# Patient Record
Sex: FEMALE | Race: WHITE | Employment: UNEMPLOYED | URBAN - NONMETROPOLITAN AREA
[De-identification: names, ages, dates, MRNs, and addresses within clinical notes are randomized per-mention and may not be internally consistent; named-entity substitution may affect disease eponyms.]

---

## 2021-04-27 ENCOUNTER — OFFICE VISIT (OUTPATIENT)
Dept: PRIMARY CARE CLINIC | Age: 56
End: 2021-04-27
Payer: COMMERCIAL

## 2021-04-27 VITALS
DIASTOLIC BLOOD PRESSURE: 80 MMHG | RESPIRATION RATE: 16 BRPM | OXYGEN SATURATION: 98 % | HEART RATE: 92 BPM | TEMPERATURE: 97.6 F | BODY MASS INDEX: 29.53 KG/M2 | HEIGHT: 64 IN | WEIGHT: 173 LBS | SYSTOLIC BLOOD PRESSURE: 123 MMHG

## 2021-04-27 DIAGNOSIS — Z13.1 SCREENING FOR DIABETES MELLITUS: ICD-10-CM

## 2021-04-27 DIAGNOSIS — L98.9 LESION OF SKIN OF FACE: ICD-10-CM

## 2021-04-27 DIAGNOSIS — Z12.11 SCREEN FOR COLON CANCER: ICD-10-CM

## 2021-04-27 DIAGNOSIS — C53.9 MALIGNANT NEOPLASM OF CERVIX, UNSPECIFIED SITE (HCC): ICD-10-CM

## 2021-04-27 DIAGNOSIS — Z13.220 ENCOUNTER FOR SCREENING FOR LIPID DISORDER: ICD-10-CM

## 2021-04-27 DIAGNOSIS — Z12.31 SCREENING MAMMOGRAM, ENCOUNTER FOR: ICD-10-CM

## 2021-04-27 DIAGNOSIS — Z00.00 WELL ADULT HEALTH CHECK: Primary | ICD-10-CM

## 2021-04-27 PROCEDURE — 99386 PREV VISIT NEW AGE 40-64: CPT | Performed by: NURSE PRACTITIONER

## 2021-04-27 RX ORDER — VALACYCLOVIR HYDROCHLORIDE 1 G/1
TABLET, FILM COATED ORAL
Qty: 6 TABLET | Refills: 1 | Status: SHIPPED | OUTPATIENT
Start: 2021-04-27 | End: 2022-05-19

## 2021-04-27 RX ORDER — DOXYCYCLINE 100 MG/1
CAPSULE ORAL
COMMUNITY
Start: 2021-04-09 | End: 2021-12-10

## 2021-04-27 RX ORDER — CONJUGATED ESTROGENS 0.62 MG/G
CREAM VAGINAL
Qty: 1 TUBE | Refills: 3 | Status: SHIPPED | OUTPATIENT
Start: 2021-04-27 | End: 2021-08-05 | Stop reason: SDUPTHER

## 2021-04-27 SDOH — HEALTH STABILITY: MENTAL HEALTH: HOW MANY STANDARD DRINKS CONTAINING ALCOHOL DO YOU HAVE ON A TYPICAL DAY?: NOT ASKED

## 2021-04-27 SDOH — HEALTH STABILITY: MENTAL HEALTH: HOW OFTEN DO YOU HAVE A DRINK CONTAINING ALCOHOL?: MONTHLY OR LESS

## 2021-04-27 ASSESSMENT — PATIENT HEALTH QUESTIONNAIRE - PHQ9
1. LITTLE INTEREST OR PLEASURE IN DOING THINGS: 0
SUM OF ALL RESPONSES TO PHQ QUESTIONS 1-9: 0
SUM OF ALL RESPONSES TO PHQ QUESTIONS 1-9: 0

## 2021-04-27 ASSESSMENT — ENCOUNTER SYMPTOMS
GASTROINTESTINAL NEGATIVE: 1
COLOR CHANGE: 1
EYES NEGATIVE: 1
RESPIRATORY NEGATIVE: 1

## 2021-04-27 NOTE — PROGRESS NOTES
[facility] ED PHYSICIAN SERVICES  [facility]  [address]  [address] [address]  Dept: [phone]  Dept Fax: [phone]  Loc: [phone]    Arnie Mauro is a 54 y.o. female who presents today for her medical conditions/complaints as noted below. Arnie Mauro is c/o of Established New Doctor (patient presents today to establish care. Former patient of Dr. Ty Booth from [facility]. )        HPI:     HPI   Chief Complaint   Patient presents with   Sol Dasilva New Doctor     patient presents today to establish care. Former patient of Dr. Ty Booth from [facility]. She has a couple places she would like me to look at. She has been using some over-the-counter supplement for her menopausal symptoms. She had a hysterectomy at a young age in kept her ovaries. She no longer thinks they were working she started having some hot flashes and night sweats. The over-the-counter medicine has helped with that but not the vaginal dryness. [place] is painful. She has never done a colonoscopy in the past.  It has been several years since she had a mammogram and that would have been in 1983 [facility]. Occasionally on her bottom she will have a sore that reoccurs usually in the same place she had it tested for herpes and was told it was negative. Her  has had herpes of the eye and she is wondering if they could have some form of herpes.   Past Medical History:   Diagnosis Date    Allergic rhinitis     Cervical cancer ([address] 01/01/1999    Skin cancer     on back       Past Surgical History:   Procedure Laterality Date    HYSTERECTOMY, TOTAL ABDOMINAL  1999       Vitals 9/20/7753   SYSTOLIC 889   DIASTOLIC 80   Pulse 92   Temp 97.6   Resp 16   SpO2 98   Weight 173 lb   Height 5' 4\"   Body mass index 29.69 kg/m2       Family History   Problem Relation Age of Onset    Diabetes Mother     Hypertension Mother     Diabetes Father     Coronary Art Dis Sister        Social History Tobacco Use    Smoking status: Never Smoker    Smokeless tobacco: Never Used   Substance Use Topics    Alcohol use: Yes     Frequency: Monthly or less      Current Outpatient Medications   Medication Sig Dispense Refill    doxycycline monohydrate (MONODOX) 100 MG capsule       valACYclovir (VALTREX) 1 g tablet Take 2 at onset of sore and repeat 2 in 24 hours 6 tablet 1    conjugated estrogens (PREMARIN) 0.625 MG/GM vaginal cream 1/2 applicator nightly for 7 nights then 2 nights a week. 1 Tube 3     No current facility-administered medications for this visit. No Known Allergies    Health Maintenance   Topic Date Due    Hepatitis C screen  Never done    HIV screen  Never done    COVID-19 Vaccine (1) Never done    DTaP/Tdap/Td vaccine (1 - Tdap) Never done    Lipid screen  Never done    Diabetes screen  Never done    Breast cancer screen  Never done    Shingles Vaccine (1 of 2) Never done    Colon cancer screen colonoscopy  Never done    Flu vaccine (Season Ended) 09/01/2021    Hepatitis A vaccine  Aged Out    Hepatitis B vaccine  Aged Out    Hib vaccine  Aged Out    Meningococcal (ACWY) vaccine  Aged Out    Pneumococcal 0-64 years Vaccine  Aged Out       Subjective:      Review of Systems   Constitutional: Negative. HENT: Negative. Eyes: Negative. Respiratory: Negative. Cardiovascular: Negative. Gastrointestinal: Negative. Genitourinary: Negative. Musculoskeletal: Negative. Skin: Positive for color change and wound. Neurological: Negative. Psychiatric/Behavioral: Negative. Objective:     Physical Exam  Vitals signs and nursing note reviewed. Constitutional:       Appearance: She is well-developed. HENT:      Head: Normocephalic. Right Ear: Tympanic membrane and external ear normal.      Left Ear: Tympanic membrane and external ear normal.      Nose: Nose normal.   Eyes:      Pupils: Pupils are equal, round, and reactive to light.    Neck: materials -see patient instructions. Discussed use, benefit, and side effects of prescribed medications. All patient questions answered. Pt voiced understanding. Reviewed health maintenance. Instructed to continue currentmedications, diet and exercise. Patient agreed with treatment plan. Follow up as directed. MEDICATIONS:  Orders Placed This Encounter   Medications    valACYclovir (VALTREX) 1 g tablet     Sig: Take 2 at onset of sore and repeat 2 in 24 hours     Dispense:  6 tablet     Refill:  1     Place on hold till patient calls    conjugated estrogens (PREMARIN) 0.625 MG/GM vaginal cream     Si/2 applicator nightly for 7 nights then 2 nights a week. Dispense:  1 Tube     Refill:  3     Has coupon         ORDERS:  Orders Placed This Encounter   Procedures    Cologuard    JOVAN DIGITAL SCREEN W OR WO CAD BILATERAL    External Referral To ENT       Follow-up:  Return in about 1 year (around 2022). PATIENT INSTRUCTIONS:  Patient Instructions   Refer ent [place] for lesion nose  Try the premarin vaginal cream and see if helps the dryness  May continue the over counter menopause med, may consider later hormone replacement  Work on diet and exercise  Return for fasting labs  cologard at home  Mammogram yearly. Valtrex is if sore pops up    Electronically signed by LAWANDA Lazcano CNP on 2021 at 1:32 PM    EMR Dragon/transcription disclaimer:  Much of thisencounter note is electronic transcription/translation of spoken language to printed texts. The electronic translation of spoken language may be erroneous, or at times, nonsensical words or phrases may be inadvertentlytranscribed.   Although I have reviewed the note for such errors, some may still exist.

## 2021-04-27 NOTE — PATIENT INSTRUCTIONS
Refer ent Bogdan Acuna for lesion nose  Try the premarin vaginal cream and see if helps the dryness  May continue the over counter menopause med, may consider later hormone replacement  Work on diet and exercise  Return for fasting labs  cologard at home  Mammogram yearly.    Valtrex is if sore pops up

## 2021-05-05 ENCOUNTER — HOSPITAL ENCOUNTER (OUTPATIENT)
Dept: WOMENS IMAGING | Age: 56
Discharge: HOME | End: 2021-05-05
Payer: COMMERCIAL

## 2021-05-05 DIAGNOSIS — Z12.31 SCREENING MAMMOGRAM, ENCOUNTER FOR: ICD-10-CM

## 2021-05-05 PROCEDURE — 77067 SCR MAMMO BI INCL CAD: CPT

## 2021-05-07 ENCOUNTER — NURSE ONLY (OUTPATIENT)
Dept: PRIMARY CARE CLINIC | Age: 56
End: 2021-05-07
Payer: COMMERCIAL

## 2021-05-07 DIAGNOSIS — Z13.220 ENCOUNTER FOR SCREENING FOR LIPID DISORDER: ICD-10-CM

## 2021-05-07 DIAGNOSIS — Z13.1 SCREENING FOR DIABETES MELLITUS: ICD-10-CM

## 2021-05-07 LAB
ALBUMIN SERPL-MCNC: 4.6 G/DL (ref 3.5–5.2)
ALP BLD-CCNC: 71 U/L (ref 35–104)
ALT SERPL-CCNC: 18 U/L (ref 5–33)
ANION GAP SERPL CALCULATED.3IONS-SCNC: 14 MMOL/L (ref 7–19)
AST SERPL-CCNC: 18 U/L (ref 5–32)
BILIRUB SERPL-MCNC: 0.4 MG/DL (ref 0.2–1.2)
BUN BLDV-MCNC: 13 MG/DL (ref 6–20)
CALCIUM SERPL-MCNC: 9.6 MG/DL (ref 8.6–10)
CHLORIDE BLD-SCNC: 106 MMOL/L (ref 98–111)
CHOLESTEROL, TOTAL: 222 MG/DL (ref 160–199)
CO2: 22 MMOL/L (ref 22–29)
CREAT SERPL-MCNC: 0.5 MG/DL (ref 0.5–0.9)
GFR AFRICAN AMERICAN: >59
GFR NON-AFRICAN AMERICAN: >60
GLUCOSE BLD-MCNC: 82 MG/DL (ref 74–109)
HDLC SERPL-MCNC: 78 MG/DL (ref 65–121)
LDL CHOLESTEROL CALCULATED: 116 MG/DL
POTASSIUM SERPL-SCNC: 4.4 MMOL/L (ref 3.5–5)
SODIUM BLD-SCNC: 142 MMOL/L (ref 136–145)
TOTAL PROTEIN: 8.2 G/DL (ref 6.6–8.7)
TRIGL SERPL-MCNC: 138 MG/DL (ref 0–149)

## 2021-05-07 PROCEDURE — 36415 COLL VENOUS BLD VENIPUNCTURE: CPT | Performed by: NURSE PRACTITIONER

## 2021-05-17 DIAGNOSIS — Z12.11 SCREEN FOR COLON CANCER: ICD-10-CM

## 2021-07-21 ENCOUNTER — OFFICE VISIT (OUTPATIENT)
Dept: OTOLARYNGOLOGY | Facility: CLINIC | Age: 56
End: 2021-07-21

## 2021-07-21 VITALS — TEMPERATURE: 96.9 F | HEART RATE: 80 BPM | DIASTOLIC BLOOD PRESSURE: 80 MMHG | SYSTOLIC BLOOD PRESSURE: 148 MMHG

## 2021-07-21 DIAGNOSIS — D48.5 NEOPLASM OF UNCERTAIN BEHAVIOR OF SKIN: Primary | ICD-10-CM

## 2021-07-21 PROCEDURE — 99203 OFFICE O/P NEW LOW 30 MIN: CPT | Performed by: OTOLARYNGOLOGY

## 2021-07-21 RX ORDER — CONJUGATED ESTROGENS 0.62 MG/G
CREAM VAGINAL
COMMUNITY
Start: 2021-04-27

## 2021-07-21 RX ORDER — MULTIPLE VITAMINS W/ MINERALS TAB 9MG-400MCG
1 TAB ORAL DAILY
COMMUNITY

## 2021-07-21 RX ORDER — VALACYCLOVIR HYDROCHLORIDE 1 G/1
TABLET, FILM COATED ORAL
COMMUNITY
Start: 2021-04-27

## 2021-07-21 NOTE — PATIENT INSTRUCTIONS
CONTACT INFORMATION:  The main office phone number is [phone]. For emergencies after hours and on weekends, this number will convert over to our answering service and the on call provider will answer. Please try to keep non emergent phone calls/ questions to office hours 9am-5pm Monday through Friday.     R[employer]ron  As an alternative, you can sign up and use the Epic MyChart system for more direct and quicker access for non emergent questions/ problems.  [facility] RF[employer]ron allows you to send messages to your doctor, view your test results, renew your prescriptions, schedule appointments, and more. To sign up, go to YODIL and click on the Sign Up Now link in the New User? box. Enter your [employer] Activation Code exactly as it appears below along with the last four digits of your Social Security Number and your Date of Birth () to complete the sign-up process. If you do not sign up before the expiration date, you must request a new code.    R[employer] Activation Code: [email or web address]  Expires: 2021 12:04 PM    If you have questions, you can email [email or web address] or call [phone] to talk to our RFMic[employer] staff. Remember, [employer] is NOT to be used for urgent needs. For medical emergencies, dial 911.

## 2021-07-21 NOTE — PROGRESS NOTES
PRIMARY CARE PROVIDER: Rose Vitale APRN  REFERRING PROVIDER: No ref. provider found    Chief Complaint   Patient presents with   • Nevus     Mole to left of nose       Subjective   History of Present Illness:  Yamileth Ontiveros is a  55 y.o. female who presents for consultation regarding a skin lesion of the left medial canthal area.  The lesion has the following characteristics:    Quality: Pigmented lesion  Severity: Mild  Duration: 4-5 years  Timing: constant  Modifying Factors: None  Associated Signs & Symptoms: This has been changing and getting slightly larger over the past few years    Hx of melanoma on her back removed 1999.    Moved from [place] due to her 's job at [employer].  They may be moving to [place] in early 2022.    Review of Systems:  Review of Systems   Constitutional: Negative for chills, fatigue, fever and unexpected weight change.   HENT: Negative for facial swelling.    Respiratory: Negative for cough, chest tightness and shortness of breath.    Cardiovascular: Negative for chest pain.   Musculoskeletal: Negative for neck pain.   Skin: Positive for color change.   Neurological: Negative for facial asymmetry.   Hematological: Negative for adenopathy. Does not bruise/bleed easily.       Past History:  History reviewed. No pertinent past medical history.  Past Surgical History:   Procedure Laterality Date   • HYSTERECTOMY     • SKIN CANCER EXCISION       Family History   Problem Relation Age of Onset   • Diabetes Mother    • Hypertension Mother    • Heart failure Sister      Social History     Tobacco Use   • Smoking status: Never Smoker   • Smokeless tobacco: Never Used   Substance Use Topics   • Alcohol use: Not Currently   • Drug use: Never     Allergies:  Patient has no known allergies.    Current Outpatient Medications:   •  conjugated estrogens (Premarin) 0.625 MG/GM vaginal cream, 1/2 applicator nightly for 7 nights then 2 nights a week., Disp: , Rfl:   •  multivitamin with  minerals tablet tablet, Take 1 tablet by mouth Daily., Disp: , Rfl:   •  valACYclovir (VALTREX) 1000 MG tablet, Take 2 at onset of sore and repeat 2 in 24 hours, Disp: , Rfl:     Objective     Vital Signs:  Temp:  [96.9 °F (36.1 °C)] 96.9 °F (36.1 °C)  Heart Rate:  [80] 80  BP: (148)/(80) 148/80    Physical Exam:  Physical Exam  Vitals and nursing note reviewed.   Constitutional:       General: She is not in acute distress.     Appearance: She is well-developed. She is not diaphoretic.   HENT:      Head: Normocephalic and atraumatic.        Right Ear: External ear normal.      Left Ear: External ear normal.      Nose: Nose normal.   Eyes:      General: No scleral icterus.        Right eye: No discharge.         Left eye: No discharge.      Conjunctiva/sclera: Conjunctivae normal.      Pupils: Pupils are equal, round, and reactive to light.   Neck:      Thyroid: No thyromegaly.      Vascular: No JVD.      Trachea: No tracheal deviation.   Pulmonary:      Effort: Pulmonary effort is normal.      Breath sounds: No stridor.   Musculoskeletal:         General: No deformity. Normal range of motion.      Cervical back: Normal range of motion and neck supple.   Lymphadenopathy:      Cervical: No cervical adenopathy.   Skin:     General: Skin is warm and dry.      Coloration: Skin is not pale.      Findings: No erythema or rash.   Neurological:      Mental Status: She is alert and oriented to person, place, and time.      Cranial Nerves: No cranial nerve deficit.      Coordination: Coordination normal.   Psychiatric:         Speech: Speech normal.         Behavior: Behavior normal. Behavior is cooperative.         Thought Content: Thought content normal.         Judgment: Judgment normal.         Operative plan:    Excision with linear closure    Assessment   1. Neoplasm of uncertain behavior of skin        Plan      I have offered excision of the lesion of the left nasal sidewall/medial canthus with  permanent section  analysis and likely  linear closure closure in the office under local anesthesia.  She would like to wait until her grandson goes back to school.    Discussion of skin lesion. Discussed risks, benefits, alternatives, and possible complications of excision of the skin lesion with reconstruction utilizing local tissue rearrangement, full-thickness skin grafting, or local interpolated flaps. Risks include, but are not limited too: bleeding, infection, hematoma, recurrence, need for additional procedures, flap failure, cosmetic deformity. Patient understands risks and would like to proceed with surgery.     My findings and recommendations were discussed and questions were answered.     Zoran Hutchison MD  07/21/21  12:08 CDT

## 2021-08-05 RX ORDER — CONJUGATED ESTROGENS 0.62 MG/G
CREAM VAGINAL
Qty: 1 TUBE | Refills: 3 | Status: SHIPPED | OUTPATIENT
Start: 2021-08-05

## 2021-08-26 ENCOUNTER — TRANSCRIBE ORDERS (OUTPATIENT)
Dept: LAB | Facility: HOSPITAL | Age: 56
End: 2021-08-26

## 2021-08-26 DIAGNOSIS — Z01.818 PREOP TESTING: Primary | ICD-10-CM

## 2021-08-30 ENCOUNTER — LAB (OUTPATIENT)
Dept: LAB | Facility: HOSPITAL | Age: 56
End: 2021-08-30

## 2021-08-30 LAB — SARS-COV-2 ORF1AB RESP QL NAA+PROBE: NOT DETECTED

## 2021-08-30 PROCEDURE — C9803 HOPD COVID-19 SPEC COLLECT: HCPCS | Performed by: OTOLARYNGOLOGY

## 2021-08-30 PROCEDURE — U0004 COV-19 TEST NON-CDC HGH THRU: HCPCS | Performed by: OTOLARYNGOLOGY

## 2021-09-01 ENCOUNTER — PROCEDURE VISIT (OUTPATIENT)
Dept: OTOLARYNGOLOGY | Facility: CLINIC | Age: 56
End: 2021-09-01

## 2021-09-01 VITALS
SYSTOLIC BLOOD PRESSURE: 136 MMHG | HEIGHT: 64 IN | RESPIRATION RATE: 20 BRPM | TEMPERATURE: 98 F | BODY MASS INDEX: 28.17 KG/M2 | DIASTOLIC BLOOD PRESSURE: 78 MMHG | WEIGHT: 165 LBS | HEART RATE: 80 BPM

## 2021-09-01 DIAGNOSIS — D48.5 NEOPLASM OF UNCERTAIN BEHAVIOR OF SKIN: Primary | ICD-10-CM

## 2021-09-01 PROCEDURE — 13151 CMPLX RPR E/N/E/L 1.1-2.5 CM: CPT | Performed by: OTOLARYNGOLOGY

## 2021-09-01 PROCEDURE — 88305 TISSUE EXAM BY PATHOLOGIST: CPT | Performed by: OTOLARYNGOLOGY

## 2021-09-01 PROCEDURE — 11641 EXC F/E/E/N/L MAL+MRG 0.6-1: CPT | Performed by: OTOLARYNGOLOGY

## 2021-09-01 NOTE — PATIENT INSTRUCTIONS
CONTACT INFORMATION:  The main office phone number is [phone]. For emergencies after hours and on weekends, this number will convert over to our answering service and the on call provider will answer. Please try to keep non emergent phone calls/ questions to office hours 9am-5pm Monday through Friday.     MyTinks  As an alternative, you can sign up and use the Epic MyChart system for more direct and quicker access for non emergent questions/ problems.  [facility] MyTinks allows you to send messages to your doctor, view your test results, renew your prescriptions, schedule appointments, and more. To sign up, go to [employer] and click on the Sign Up Now link in the New User? box. Enter your MyTinks Activation Code exactly as it appears below along with the last four digits of your Social Security Number and your Date of Birth () to complete the sign-up process. If you do not sign up before the expiration date, you must request a new code.    MyTinks Activation Code: [email or web address]  Expires: 2021 12:53 PM    If you have questions, you can email [email or web address] or call [phone] to talk to our MyTinks staff. Remember, MyTinks is NOT to be used for urgent needs. For medical emergencies, dial 911.

## 2021-09-01 NOTE — PROGRESS NOTES
PATIENT NAME:  Yamileth Ontiveros    DATE:  09/01/21    PREOPERATIVE DIAGNOSIS: Changing nevus of left nasal sidewall    POSTOPERATIVE DIAGNOSIS: Changing nevus of left nasal sidewall    PROCEDURE:  1) excision of neoplasm of uncertain behavior skin of left nasal sidewall, 8 mm x 20 mm   2) closure skin of nose, 2.0 cm    SURGEON:  Zoran Hutchison MD, FACS    FACILITY: [facility] Office Procedure Room    ANESTHESIA:  Local with 1 cc 1% lidocaine and 1:100,000 epinephrine    DICTATED BY:  Zoran Hutchison MD, FACS    IVF: None    IMPLANTS: None    DRAINS: None    SPECIMENS: Neoplasm uncertain behavior left nasal sidewall, stitch 12-permanent    EBL: 20 cc    COMPLICATIONS: None apparent    INDICATIONS FOR SURGERY: Weston presented with a pigmented, changing neoplasm of the left nasal sidewall.  She opted for surgical excision for full pathological analysis.    OPERATIVE FINDINGS:     Lesion: 5 mm x 4 mm  Margins: 1.5 mm  Defect: 8 mm x 20 mm  Depth: Through dermis  Closure Length: 2.0 cm    OPERATIVE DETAILS:       After patient verification consent material was reviewed, the patient was taken to the procedure room and laid supine on the procedure table.  The skin at the area was cleansed with alcohol, the area marked, the patient was then handed a mirror where we reviewed the intended excision, and verified the consent.  This served as the formal timeout procedure.  The skin was  infiltrated with 1% lidocaine with 1-100,000 epinephrine.    After approximately 15 minutes, the skin was tested for anesthesia, and deemed appropriate.  The lesion was marked with the above listed dimensions, the appropriate margins, as listed above, were drawn around this.  This was then converted to a fusiform-type incision to allow closure and to decrease the standing cutaneous deformities associated with circular to oval-type defects.    The patient was then sterilely prepped and draped.    A 15 blade was used to incise the skin  along the prior-marked plan.  The skin was then undermined in the subcutaneous plane utilizing a #15.  The specimen was oriented with a suture at 12:00 and sent for permanent section analysis.    Utilizing a fresh 15 blade, the skin was undermined in the subcutaneous plane for approximately 5 mm in each direction to facilitate wound closure with reduced tension, and wound eversion.    The skin was closed utilizing deep, buried 5-0 undyed Monocryl suture.  The overlying skin was closed utilizing interrupted 6-0 Prolene.    Antibiotic ointment was placed to the incisions.      DISPOSITION:  The procedures were completed without complication and tolerated well.  The patient was released in the company of herself to return home in satisfactory condition.  A follow-up appointment has been scheduled, routine post-op medications prescribed (if required), and post-op instructions were given to the responsible party.           Zoran Hutchison MD, FACS  Board Certified Facial Plastic and Reconstructive Surgery  Board Certified Otolaryngology -- Head and Neck Surgery    Electronically signed by Zoran Hutchison MD, 09/01/21, 11:57 AM CDT.

## 2021-09-04 LAB
CYTO UR: NORMAL
LAB AP CASE REPORT: NORMAL
LAB AP CLINICAL INFORMATION: NORMAL
LAB AP DIAGNOSIS COMMENT: NORMAL
PATH REPORT.FINAL DX SPEC: NORMAL
PATH REPORT.GROSS SPEC: NORMAL

## 2021-09-09 ENCOUNTER — OFFICE VISIT (OUTPATIENT)
Dept: OTOLARYNGOLOGY | Facility: CLINIC | Age: 56
End: 2021-09-09

## 2021-09-09 DIAGNOSIS — Z48.02 VISIT FOR SUTURE REMOVAL: Primary | ICD-10-CM

## 2021-09-09 PROCEDURE — 99024 POSTOP FOLLOW-UP VISIT: CPT | Performed by: OTOLARYNGOLOGY

## 2021-09-09 NOTE — PROGRESS NOTES
Pt here for suture removal.  Pt tolerated well and was given instructions on the use of scar away.  Pt voiced understanding.  Pt was given Path report and voiced understanding.

## 2021-09-13 ENCOUNTER — TRANSCRIBE ORDERS (OUTPATIENT)
Dept: LAB | Facility: HOSPITAL | Age: 56
End: 2021-09-13

## 2021-09-13 DIAGNOSIS — Z01.818 PREOP TESTING: Primary | ICD-10-CM

## 2021-10-22 ENCOUNTER — LAB (OUTPATIENT)
Dept: LAB | Facility: HOSPITAL | Age: 56
End: 2021-10-22

## 2021-10-22 DIAGNOSIS — Z01.818 PREOP TESTING: ICD-10-CM

## 2021-10-22 LAB — SARS-COV-2 ORF1AB RESP QL NAA+PROBE: NOT DETECTED

## 2021-10-22 PROCEDURE — C9803 HOPD COVID-19 SPEC COLLECT: HCPCS

## 2021-10-22 PROCEDURE — U0004 COV-19 TEST NON-CDC HGH THRU: HCPCS

## 2021-10-25 ENCOUNTER — PROCEDURE VISIT (OUTPATIENT)
Dept: OTOLARYNGOLOGY | Facility: CLINIC | Age: 56
End: 2021-10-25

## 2021-10-25 VITALS — SYSTOLIC BLOOD PRESSURE: 149 MMHG | HEART RATE: 81 BPM | DIASTOLIC BLOOD PRESSURE: 83 MMHG | TEMPERATURE: 97.7 F

## 2021-10-25 DIAGNOSIS — C44.311 BASAL CELL CARCINOMA (BCC) OF LATERAL SIDE WALL OF NOSE: Primary | ICD-10-CM

## 2021-10-25 PROCEDURE — 13151 CMPLX RPR E/N/E/L 1.1-2.5 CM: CPT | Performed by: OTOLARYNGOLOGY

## 2021-10-25 PROCEDURE — 88305 TISSUE EXAM BY PATHOLOGIST: CPT | Performed by: OTOLARYNGOLOGY

## 2021-10-25 PROCEDURE — 11641 EXC F/E/E/N/L MAL+MRG 0.6-1: CPT | Performed by: OTOLARYNGOLOGY

## 2021-10-25 NOTE — PROGRESS NOTES
PATIENT NAME:  Yamileth Ontiveros    DATE:  10/25/21    PREOPERATIVE DIAGNOSIS: Basal cell carcinoma skin of left nasal sidewall at the medial canthus    POSTOPERATIVE DIAGNOSIS: Basal cell carcinoma skin of the left nasal sidewall at the medial canthus    PROCEDURE:  1) excision of malignant neoplasm skin left nasal sidewall, 5 mm x 18 mm   2) complex closure skin of nose, 2.0 cm    SURGEON:  Zoran Hutchison MD, FACS    FACILITY: [facility] Office Procedure Room    ANESTHESIA:  Local with 2 cc 1% lidocaine and 1:100,000 epinephrine    DICTATED BY:  Zoran Hutchison MD, FACS    IVF: None    IMPLANTS: None    DRAINS: None    SPECIMENS: Basal cell carcinoma skin of nose, stitch at 12:00-permanent    EBL: Less than 10 cc    COMPLICATIONS: None apparent    INDICATIONS FOR SURGERY: Ms. Ontiveros initially presented with a pigmented lesion of the left nasal sidewall close to the medial canthus.  An excisional biopsy was performed.  Final pathology demonstrated a pigmented basal cell carcinoma with involvement around 2:00.  She presented for further excision.  I discussed the options of the frozen section versus permanent section analysis.  We opted on permanent section analysis due to a more accurate ability to assess the margin.    OPERATIVE FINDINGS:     Lesion: Prior incision measuring 0.5 mm in thickness by 18 mm  Margins: 3 mm on the 12:00 to 6:00 side, 1.5 mm on the 6:00 to 12:00 side  Defect: 5 mm x 18 mm  Depth: Through dermis  Closure Length: 2.0 cm    OPERATIVE DETAILS:       After patient verification consent material was reviewed, the patient was taken to the procedure room and laid supine on the procedure table.  The skin at the area was cleansed with alcohol, the area marked, the patient was then handed a mirror where we reviewed the intended excision, and verified the consent.  This served as the formal timeout procedure.  The skin was  infiltrated with 1% lidocaine with 1-100,000 epinephrine.    After  approximately 15 minutes, the skin was tested for anesthesia, and deemed appropriate.  The lesion was marked with the above listed dimensions, the appropriate margins, as listed above, were drawn around this.  This was then converted to a fusiform-type incision to allow closure and to decrease the standing cutaneous deformities associated with circular to oval-type defects.    The patient was then sterilely prepped and draped.    A 15 blade was used to incise the skin along the prior-marked plan.  The skin was then undermined in the subcutaneous plane utilizing a #15.  The specimen was oriented with a suture at 12:00 and sent for permanent section analysis.    Utilizing a fresh 15 blade, the skin was undermined in the subcutaneous plane for approximately 6 mm in each direction to facilitate wound closure with reduced tension, and wound eversion, as the defect was at the medial canthus and I did not want to incite disruption of the lacrimal system.    The skin was closed utilizing deep, buried 5-0 undyed Monocryl suture.  The overlying skin was closed utilizing running, locking 6-0 Prolene.    Antibiotic ointment was placed to the incisions.      DISPOSITION:  The procedures were completed without complication and tolerated well.  The patient was released in the company of her  to return home in satisfactory condition.  A follow-up appointment has been scheduled, routine post-op medications prescribed (if required), and post-op instructions were given to the responsible party.           Zoran Hutchison MD, FACS  Board Certified Facial Plastic and Reconstructive Surgery  Board Certified Otolaryngology -- Head and Neck Surgery    Electronically signed by Zoran Hutchison MD, 10/25/21, 9:38 AM CDT.

## 2021-10-25 NOTE — PATIENT INSTRUCTIONS
CONTACT INFORMATION:  The main office phone number is [phone]. For emergencies after hours and on weekends, this number will convert over to our answering service and the on call provider will answer. Please try to keep non emergent phone calls/ questions to office hours 9am-5pm Monday through Friday.     [employer]  As an alternative, you can sign up and use the Epic MyChart system for more direct and quicker access for non emergent questions/ problems.  [facility] [employer] allows you to send messages to your doctor, view your test results, renew your prescriptions, schedule appointments, and more. To sign up, go to Ghost and click on the Sign Up Now link in the New User? box. Enter your [employer] Activation Code exactly as it appears below along with the last four digits of your Social Security Number and your Date of Birth () to complete the sign-up process. If you do not sign up before the expiration date, you must request a new code.    [employer] Activation Code: [email or web address]  Expires: 2021  8:46 AM    If you have questions, you can email [email or web address] or call [phone] to talk to our [employer] staff. Remember, [employer] is NOT to be used for urgent needs. For medical emergencies, dial 911.

## 2021-10-27 LAB
CYTO UR: NORMAL
LAB AP CASE REPORT: NORMAL
LAB AP CLINICAL INFORMATION: NORMAL
PATH REPORT.FINAL DX SPEC: NORMAL
PATH REPORT.GROSS SPEC: NORMAL

## 2021-11-02 ENCOUNTER — OFFICE VISIT (OUTPATIENT)
Dept: OTOLARYNGOLOGY | Facility: CLINIC | Age: 56
End: 2021-11-02

## 2021-11-02 DIAGNOSIS — Z48.02 VISIT FOR SUTURE REMOVAL: Primary | ICD-10-CM

## 2021-11-02 PROCEDURE — 99024 POSTOP FOLLOW-UP VISIT: CPT | Performed by: OTOLARYNGOLOGY

## 2021-11-02 NOTE — PROGRESS NOTES
Patient is here for post-op visit aet exc of bcc of left nasal dorsum. Patient is doing well and wound looks great. She was given instructions on wound care after suture removal and she will follow up in 3-4 month call in between time if she has any concerns. patient was advise on importance of seeing a Dermatologist. She is going to make an appointment to have full body check.

## 2021-12-10 ENCOUNTER — OFFICE VISIT (OUTPATIENT)
Dept: PRIMARY CARE CLINIC | Age: 56
End: 2021-12-10
Payer: COMMERCIAL

## 2021-12-10 VITALS
HEART RATE: 92 BPM | OXYGEN SATURATION: 97 % | SYSTOLIC BLOOD PRESSURE: 130 MMHG | DIASTOLIC BLOOD PRESSURE: 80 MMHG | HEIGHT: 64 IN | TEMPERATURE: 97.6 F | BODY MASS INDEX: 26.98 KG/M2 | WEIGHT: 158 LBS

## 2021-12-10 DIAGNOSIS — J06.9 VIRAL UPPER RESPIRATORY TRACT INFECTION: Primary | ICD-10-CM

## 2021-12-10 PROCEDURE — 99213 OFFICE O/P EST LOW 20 MIN: CPT | Performed by: NURSE PRACTITIONER

## 2021-12-10 RX ORDER — MULTIPLE VITAMINS W/ MINERALS TAB 9MG-400MCG
1 TAB ORAL DAILY
COMMUNITY

## 2021-12-10 RX ORDER — AMOXICILLIN AND CLAVULANATE POTASSIUM 875; 125 MG/1; MG/1
1 TABLET, FILM COATED ORAL 2 TIMES DAILY
Qty: 14 TABLET | Refills: 0 | Status: SHIPPED | OUTPATIENT
Start: 2021-12-10 | End: 2021-12-17

## 2021-12-10 RX ORDER — METHYLPREDNISOLONE 4 MG/1
TABLET ORAL
Qty: 1 KIT | Refills: 0 | Status: SHIPPED | OUTPATIENT
Start: 2021-12-10 | End: 2021-12-16

## 2021-12-10 ASSESSMENT — ENCOUNTER SYMPTOMS
SINUS PAIN: 1
COUGH: 1

## 2021-12-10 NOTE — PROGRESS NOTES
[facility] PHYSICIAN SERVICES  [facility]  [address] [place] [address]  [address] [place] [address]  Dept: [phone]  Dept Fax: [phone]  Loc: [phone]    Fabiana Green is a 64 y.o. female who presents today for her medical conditions/complaints as noted below. Fabiana Green is c/o of Congestion (head and chest.  Since monday morning.  )        HPI:     HPI   Chief Complaint   Patient presents with    Congestion     head and chest.  Since monday morning. Past Medical History:   Diagnosis Date    Allergic rhinitis     Cervical cancer ([facility] [address] 01/01/1999    Skin cancer     on back       Past Surgical History:   Procedure Laterality Date    HYSTERECTOMY, TOTAL ABDOMINAL  1999       Vitals 12/10/2021 2/27/0120   SYSTOLIC 905 845   DIASTOLIC 80 80   Pulse 92 92   Temp 97.6 97.6   Resp - 16   SpO2 97 98   Weight 158 lb 173 lb   Height 5' 4\" 5' 4\"   Body mass index 27.12 kg/m2 29.69 kg/m2       Family History   Problem Relation Age of Onset    Diabetes Mother     Hypertension Mother     Diabetes Father     Coronary Art Dis Sister        Social History     Tobacco Use    Smoking status: Never Smoker    Smokeless tobacco: Never Used   Substance Use Topics    Alcohol use: Yes      Current Outpatient Medications on File Prior to Visit   Medication Sig Dispense Refill    Multiple Vitamins-Minerals (THERA M PLUS) TABS Take 1 tablet by mouth daily      conjugated estrogens (PREMARIN) 0.625 MG/GM vaginal cream 1/2 applicator nightly for 7 nights then 2 nights a week. 1 Tube 3    valACYclovir (VALTREX) 1 g tablet Take 2 at onset of sore and repeat 2 in 24 hours (Patient not taking: Reported on 12/10/2021) 6 tablet 1     No current facility-administered medications on file prior to visit.      No Known Allergies    Health Maintenance   Topic Date Due    Hepatitis C screen  Never done    COVID-19 Vaccine (1) Never done    HIV screen  Never done    DTaP/Tdap/Td vaccine (1 - Tdap) Never done    Cervical time was spent counseling and coordinating care for a total time of 20min face to face. I offered to do a Covid test and she declined. See the plan below. PDMP Monitoring:    Last PDMP [place] as Reviewed [facility]):  Review User Review Instant Review Result            Urine Drug Screenings (1 yr)    No resulted procedures found. Medication Contract and Consent for Opioid Use Documents Filed      No documents found                 Patient given educational materials -see patient instructions. Discussed use, benefit, and side effects of prescribed medications. All patient questions answered. Pt voiced understanding. Reviewed health maintenance. Instructed to continue currentmedications, diet and exercise. Patient agreed with treatment plan. Follow up as directed. MEDICATIONS:  Orders Placed This Encounter   Medications    amoxicillin-clavulanate (AUGMENTIN) 875-125 MG per tablet     Sig: Take 1 tablet by mouth 2 times daily for 7 days     Dispense:  14 tablet     Refill:  0    methylPREDNISolone (MEDROL DOSEPACK) 4 MG tablet     Sig: Take by mouth. For inflammation     Dispense:  1 kit     Refill:  0         ORDERS:  No orders of the defined types were placed in this encounter. Follow-up:  No follow-ups on file. PATIENT INSTRUCTIONS:  Patient Instructions   Lots of clear liquids  mucinex if in chest , thins secretion  flonase nasal spray otc if sinus pressure  Start the augmentin antibiotic tonight  May start medrol pack steroid tomorrow for inlammation if desired. If worse symptoms could go get covid checked  Continue vitamins for immune health. Electronically signed by LAWANDA Vogel CNP on 12/10/2021 at 4:19 PM    EMR Dragon/transcription disclaimer:  Much of thisencounter note is electronic transcription/translation of spoken language to printed texts.   The electronic translation of spoken language may be erroneous, or at times, nonsensical words or phrases may be inadvertentlytranscribed.   Although I have reviewed the note for such errors, some may still exist.

## 2021-12-10 NOTE — PATIENT INSTRUCTIONS
Lots of clear liquids  mucinex if in chest , thins secretion  flonase nasal spray otc if sinus pressure  Start the augmentin antibiotic tonight  May start medrol pack steroid tomorrow for inlammation if desired. If worse symptoms could go get covid checked  Continue vitamins for immune health.

## 2022-01-12 ENCOUNTER — OFFICE VISIT (OUTPATIENT)
Dept: OTOLARYNGOLOGY | Facility: CLINIC | Age: 57
End: 2022-01-12

## 2022-01-12 VITALS
SYSTOLIC BLOOD PRESSURE: 130 MMHG | TEMPERATURE: 98 F | RESPIRATION RATE: 20 BRPM | WEIGHT: 165 LBS | DIASTOLIC BLOOD PRESSURE: 74 MMHG | HEIGHT: 64 IN | HEART RATE: 76 BPM | BODY MASS INDEX: 28.17 KG/M2

## 2022-01-12 DIAGNOSIS — Z85.828 ENCOUNTER FOR FOLLOW-UP SURVEILLANCE OF SKIN CANCER: Primary | ICD-10-CM

## 2022-01-12 DIAGNOSIS — Z08 ENCOUNTER FOR FOLLOW-UP SURVEILLANCE OF SKIN CANCER: Primary | ICD-10-CM

## 2022-01-12 DIAGNOSIS — C44.311 BASAL CELL CARCINOMA (BCC) OF LATERAL SIDE WALL OF NOSE: ICD-10-CM

## 2022-01-12 PROCEDURE — 99212 OFFICE O/P EST SF 10 MIN: CPT | Performed by: OTOLARYNGOLOGY

## 2022-01-12 NOTE — PROGRESS NOTES
PRIMARY CARE PROVIDER: Rose Vitale APRN  REFERRING PROVIDER: No ref. provider found    Chief Complaint   Patient presents with   • Follow-up     exc lesion of nasal sidewall       Subjective   History of Present Illness:  Yamileth Ontiveros is a  56 y.o. female who presents for head neck skin cancer surveillance.  She underwent excision of a basal cell carcinoma of the left nasal sidewall at the medial canthus, with complex linear closure on October 25, 2021.  She is doing reasonably well.  She denies any new, or suspicious lesions of the head or neck.      She is breathing well.    She is moving to [place] in a few months.    Review of Systems:  Review of Systems   Constitutional: Negative for chills, fatigue, fever and unexpected weight change.   HENT: Negative for facial swelling.    Respiratory: Negative for cough, chest tightness and shortness of breath.    Cardiovascular: Negative for chest pain.   Musculoskeletal: Negative for neck pain.   Skin: Negative for color change.   Neurological: Negative for facial asymmetry.   Hematological: Negative for adenopathy. Does not bruise/bleed easily.       Past History:  No past medical history on file.  Past Surgical History:   Procedure Laterality Date   • HYSTERECTOMY     • SKIN CANCER EXCISION     • SKIN LESION EXCISION      exc left nasal sidewall lesion 09/01/21     Family History   Problem Relation Age of Onset   • Diabetes Mother    • Hypertension Mother    • Heart failure Sister      Social History     Tobacco Use   • Smoking status: Never Smoker   • Smokeless tobacco: Never Used   Substance Use Topics   • Alcohol use: Not Currently   • Drug use: Never     Allergies:  Patient has no known allergies.    Current Outpatient Medications:   •  conjugated estrogens (Premarin) 0.625 MG/GM vaginal cream, 1/2 applicator nightly for 7 nights then 2 nights a week., Disp: , Rfl:   •  multivitamin with minerals tablet tablet, Take 1 tablet by mouth Daily., Disp: , Rfl:   •   valACYclovir (VALTREX) 1000 MG tablet, Take 2 at onset of sore and repeat 2 in 24 hours, Disp: , Rfl:       Objective     Vital Signs:  Temp:  [98 °F (36.7 °C)] 98 °F (36.7 °C)  Resp:  [20] 20    Physical Exam:  Physical Exam  Vitals and nursing note reviewed.   Constitutional:       General: She is not in acute distress.     Appearance: She is well-developed. She is not diaphoretic.   HENT:      Head: Normocephalic and atraumatic.        Right Ear: External ear normal.      Left Ear: External ear normal.      Nose: Nose normal.   Eyes:      General: No scleral icterus.        Right eye: No discharge.         Left eye: No discharge.      Conjunctiva/sclera: Conjunctivae normal.      Pupils: Pupils are equal, round, and reactive to light.   Neck:      Thyroid: No thyromegaly.      Vascular: No JVD.      Trachea: No tracheal deviation.   Pulmonary:      Effort: Pulmonary effort is normal.      Breath sounds: No stridor.   Musculoskeletal:         General: No deformity. Normal range of motion.      Cervical back: Normal range of motion and neck supple.   Lymphadenopathy:      Cervical: No cervical adenopathy.   Skin:     General: Skin is warm and dry.      Coloration: Skin is not pale.      Findings: No erythema or rash.   Neurological:      Mental Status: She is alert and oriented to person, place, and time.      Cranial Nerves: No cranial nerve deficit.      Coordination: Coordination normal.   Psychiatric:         Speech: Speech normal.         Behavior: Behavior normal. Behavior is cooperative.         Thought Content: Thought content normal.         Judgment: Judgment normal.         Results Review:   I have reviewed her excisional pathology which demonstrates a scar and multinucleated giant cell reaction with no residual tumor.  The original excisional pathology demonstrated a pigmented basal cell carcinoma with margin involvement and abutting the 2:00 margin    Assessment   Assessment:  1. Encounter for  follow-up surveillance of skin cancer    2. Basal cell carcinoma (BCC) of lateral side wall of nose        Plan   Plan:    [facility], Post-Procedure Instructions:    Protect the incisions from sunlight. Sunlight to the incisions will cause permanent pigmentation to the incision line and make the incision more noticeable. After the incision has reepithelialized (typically 2-3 weeks after the procedure), you may begin to use sunscreen with an SPF of 15 or greater    For the first week after your procedure, apply a thin coat of Vaseline to the incision 3-4 times daily.  Starting the second week, use a silicone-based wound cream to the incisions to optimize the end result. Apply topically twice daily, or as directed, to help optimize wound healing and decrease redness.    It is very important to continue routine skin checks with a dermatologist or your PCP every 6-12 months.     F/U PRN - Moving to [place].    My findings and recommendations were discussed and questions were answered.     Zoran Hutchison MD  01/12/22  14:55 CST

## 2022-01-12 NOTE — PATIENT INSTRUCTIONS
CONTACT INFORMATION:  The main office phone number is [phone]. For emergencies after hours and on weekends, this number will convert over to our answering service and the on call provider will answer. Please try to keep non emergent phone calls/ questions to office hours 9am-5pm Monday through Friday.     [facility]  As an alternative, you can sign up and use the Epic MyChart system for more direct and quicker access for non emergent questions/ problems.  [facility] allows you to send messages to your doctor, view your test results, renew your prescriptions, schedule appointments, and more. To sign up, go to [employer] and click on the Sign Up Now link in the New User? box. Enter your [facility] [facility] Activation Code exactly as it appears below along with the last four digits of your Social Security Number and your Date of Birth () to complete the sign-up process. If you do not sign up before the expiration date, you must request a new code.    [employer] [facility] Activation Code: [email or web address]  Expires: 2022  5:41 AM    If you have questions, you can email [email or web address] or call [phone] to talk to our [facility] staff. Remember, [employer] is NOT to be used for urgent needs. For medical emergencies, dial 911.

## 2022-02-25 ENCOUNTER — TELEPHONE (OUTPATIENT)
Dept: PRIMARY CARE CLINIC | Age: 57
End: 2022-02-25

## 2022-02-25 RX ORDER — ESTRADIOL 10 UG/1
INSERT VAGINAL
Qty: 8 TABLET | Refills: 3 | Status: SHIPPED | OUTPATIENT
Start: 2022-02-25 | End: 2022-05-19

## 2022-02-25 NOTE — TELEPHONE ENCOUNTER
Pt's new insurance will not cover premarin. A PA is needed or for her try try alternatives. Insurance will cover estradiol or vagifem.

## 2022-04-27 ENCOUNTER — OFFICE VISIT (OUTPATIENT)
Dept: PRIMARY CARE CLINIC | Age: 57
End: 2022-04-27
Payer: COMMERCIAL

## 2022-04-27 VITALS
TEMPERATURE: 97.6 F | WEIGHT: 157 LBS | RESPIRATION RATE: 18 BRPM | OXYGEN SATURATION: 99 % | BODY MASS INDEX: 26.8 KG/M2 | SYSTOLIC BLOOD PRESSURE: 124 MMHG | HEART RATE: 78 BPM | DIASTOLIC BLOOD PRESSURE: 80 MMHG | HEIGHT: 64 IN

## 2022-04-27 DIAGNOSIS — Z12.31 ENCOUNTER FOR SCREENING MAMMOGRAM FOR BREAST CANCER: ICD-10-CM

## 2022-04-27 DIAGNOSIS — Z13.1 SCREENING FOR DIABETES MELLITUS: ICD-10-CM

## 2022-04-27 DIAGNOSIS — Z00.00 WELL ADULT HEALTH CHECK: Primary | ICD-10-CM

## 2022-04-27 DIAGNOSIS — Z13.220 ENCOUNTER FOR SCREENING FOR LIPID DISORDER: ICD-10-CM

## 2022-04-27 DIAGNOSIS — Z23 NEED FOR SHINGLES VACCINE: ICD-10-CM

## 2022-04-27 LAB
ALBUMIN SERPL-MCNC: 4.6 G/DL (ref 3.5–5.2)
ALP BLD-CCNC: 56 U/L (ref 35–104)
ALT SERPL-CCNC: 15 U/L (ref 5–33)
ANION GAP SERPL CALCULATED.3IONS-SCNC: 10 MMOL/L (ref 7–19)
AST SERPL-CCNC: 20 U/L (ref 5–32)
BILIRUB SERPL-MCNC: 0.4 MG/DL (ref 0.2–1.2)
BUN BLDV-MCNC: 12 MG/DL (ref 6–20)
CALCIUM SERPL-MCNC: 9.5 MG/DL (ref 8.6–10)
CHLORIDE BLD-SCNC: 103 MMOL/L (ref 98–111)
CHOLESTEROL, TOTAL: 243 MG/DL (ref 160–199)
CO2: 27 MMOL/L (ref 22–29)
CREAT SERPL-MCNC: 0.5 MG/DL (ref 0.5–0.9)
GFR AFRICAN AMERICAN: >59
GFR NON-AFRICAN AMERICAN: >60
GLUCOSE BLD-MCNC: 86 MG/DL (ref 74–109)
HDLC SERPL-MCNC: 90 MG/DL (ref 65–121)
LDL CHOLESTEROL CALCULATED: 138 MG/DL
POTASSIUM SERPL-SCNC: 4.5 MMOL/L (ref 3.5–5)
SODIUM BLD-SCNC: 140 MMOL/L (ref 136–145)
TOTAL PROTEIN: 7.6 G/DL (ref 6.6–8.7)
TRIGL SERPL-MCNC: 73 MG/DL (ref 0–149)

## 2022-04-27 PROCEDURE — 99396 PREV VISIT EST AGE 40-64: CPT | Performed by: NURSE PRACTITIONER

## 2022-04-27 RX ORDER — MAGNESIUM 30 MG
30 TABLET ORAL 2 TIMES DAILY
COMMUNITY

## 2022-04-27 RX ORDER — POTASSIUM CHLORIDE 750 MG/1
10 TABLET, EXTENDED RELEASE ORAL 2 TIMES DAILY
COMMUNITY

## 2022-04-27 RX ORDER — VITAMIN B COMPLEX
1 CAPSULE ORAL DAILY
COMMUNITY

## 2022-04-27 ASSESSMENT — ENCOUNTER SYMPTOMS
EYES NEGATIVE: 1
RESPIRATORY NEGATIVE: 1
GASTROINTESTINAL NEGATIVE: 1

## 2022-04-27 NOTE — PROGRESS NOTES
[facility] PHYSICIAN SERVICES  [facility]  [address] [place] [address]  [address] [address]  Dept: [phone]  Dept Fax: [phone]  Loc: [phone]    Jan Mehta is a 64 y.o. female who presents today for her medical conditions/complaints as noted below. Jan Mehta is c/o of Annual Exam (PT is here for a physical. Pt is fasting. Pt cites pierce.)        HPI:     HPI   Chief Complaint   Patient presents with    Annual Exam     PT is here for a physical. Pt is fasting. Pt cites pierce. Her  is gotten transferred and they are moving to [place]. She is fasting for blood work today. I questioned her again today about the diagnosis of cervical cancer she said that her GYN told her she did not need Pap smears after she got the hysterectomy. She is using the Vagifem because I thought her insurance will pay for it is working fine.   Past Medical History:   Diagnosis Date    Allergic rhinitis     Basal cell carcinoma     Cervical cancer ([facility] [address] 01/01/1999    spot on left side of nose    Skin cancer     on back       Past Surgical History:   Procedure Laterality Date    HYSTERECTOMY, TOTAL ABDOMINAL  1999       Vitals 4/27/2022 12/10/2021 8/13/9859   SYSTOLIC 151 769 543   DIASTOLIC 80 80 80   Site Left Upper Arm - -   Position Sitting - -   Cuff Size Medium Adult - -   Pulse 78 92 92   Temp 97.6 97.6 97.6   Resp 18 - 16   SpO2 99 97 98   Weight 157 lb 158 lb 173 lb   Height 5' 4\" 5' 4\" 5' 4\"   Body mass index 26.95 kg/m2 27.12 kg/m2 29.69 kg/m2       Family History   Problem Relation Age of Onset    Diabetes Mother     Hypertension Mother     Diabetes Father     Coronary Art Dis Sister        Social History     Tobacco Use    Smoking status: Never Smoker    Smokeless tobacco: Never Used   Substance Use Topics    Alcohol use: Yes     Comment: social      Current Outpatient Medications on File Prior to Visit   Medication Sig Dispense Refill    magnesium 30 MG tablet Take 30 mg by mouth 2 times daily      potassium chloride (KLOR-CON M) 10 MEQ extended release tablet Take 10 mEq by mouth 2 times daily      Specialty Vitamins Products (MENOPAUSE RELIEF PO) Take by mouth      b complex vitamins capsule Take 1 capsule by mouth daily      Estradiol (VAGIFEM) 10 MCG TABS vaginal tablet Insert into the vagina 1 tab 2 nights a week 8 tablet 3    Multiple Vitamins-Minerals (THERA M PLUS) TABS Take 1 tablet by mouth daily      valACYclovir (VALTREX) 1 g tablet Take 2 at onset of sore and repeat 2 in 24 hours 6 tablet 1    conjugated estrogens (PREMARIN) 0.625 MG/GM vaginal cream 1/2 applicator nightly for 7 nights then 2 nights a week. (Patient not taking: Reported on 4/27/2022) 1 Tube 3     No current facility-administered medications on file prior to visit. No Known Allergies    Health Maintenance   Topic Date Due    HIV screen  Never done    Hepatitis C screen  Never done    Cervical cancer screen  Never done    Shingles Vaccine (1 of 2) Never done    COVID-19 Vaccine (3 - Booster for Moderna series) 02/17/2022    Depression Screen  04/27/2022    DTaP/Tdap/Td vaccine (1 - Tdap) 04/27/2023 (Originally 7/23/1984)    Flu vaccine (Season Ended) 09/01/2022    Breast cancer screen  05/05/2023    Colorectal Cancer Screen  05/10/2024    Lipids  05/07/2026    Hepatitis A vaccine  Aged Out    Hepatitis B vaccine  Aged Out    Hib vaccine  Aged Out    Meningococcal (ACWY) vaccine  Aged Out    Pneumococcal 0-64 years Vaccine  Aged Out       Subjective:      Review of Systems   Constitutional: Negative. HENT: Negative. Eyes: Negative. Respiratory: Negative. Cardiovascular: Negative. Gastrointestinal: Negative. Genitourinary: Negative. Musculoskeletal: Negative. Skin: Negative. Neurological: Negative. Psychiatric/Behavioral: Negative. Objective:     Physical Exam  Vitals and nursing note reviewed.    Constitutional:       Appearance: She is well-developed. HENT:      Head: Normocephalic. Right Ear: External ear normal.      Left Ear: External ear normal.      Nose: Nose normal.      Mouth/Throat:      Mouth: Mucous membranes are moist.   Eyes:      Pupils: Pupils are equal, round, and reactive to light. Cardiovascular:      Rate and Rhythm: Normal rate and regular rhythm. Heart sounds: Normal heart sounds. Pulmonary:      Effort: Pulmonary effort is normal.      Breath sounds: Normal breath sounds. Abdominal:      General: Abdomen is flat. Bowel sounds are normal.   Genitourinary:     Comments: Declined gu and breast exam  Musculoskeletal:         General: Normal range of motion. Cervical back: Normal range of motion. Skin:     General: Skin is warm and dry. Capillary Refill: Capillary refill takes less than 2 seconds. Neurological:      General: No focal deficit present. Mental Status: She is alert and oriented to person, place, and time. Psychiatric:         Mood and Affect: Mood normal.         Behavior: Behavior normal.         Thought Content: Thought content normal.         Judgment: Judgment normal.       /80 (Site: Left Upper Arm, Position: Sitting, Cuff Size: Medium Adult)   Pulse 78   Temp 97.6 °F (36.4 °C) (Temporal)   Resp 18   Ht 5' 4\" (1.626 m)   Wt 157 lb (71.2 kg)   SpO2 99%   BMI 26.95 kg/m²     Assessment:       Diagnosis Orders   1. Well adult health check  Comprehensive Metabolic Panel    Lipid Panel   2. Need for shingles vaccine     3. Encounter for screening mammogram for breast cancer  JOVAN DIGITAL SCREEN W OR WO CAD BILATERAL   4. Encounter for screening for lipid disorder  Lipid Panel   5. Screening for diabetes mellitus  Comprehensive Metabolic Panel         Plan:   She wants to wait till she gets [place] to see a dermatologist to have a good skin check. I did not see anything abnormal today but she does have a history of skin cancer. I did continue her Vagifem for her.   She has new insurance this year so I did go ahead and do her fasting blood work today    PDMP Monitoring:    Last PDMP [place] as Reviewed [facility]):  Review User Review Instant Review Result            Urine Drug Screenings (1 yr)    No resulted procedures found. Medication Contract and Consent for Opioid Use Documents Filed      No documents found                 Patient given educational materials -see patient instructions. Discussed use, benefit, and side effects of prescribed medications. All patient questions answered. Pt voiced understanding. Reviewed health maintenance. Instructed to continue currentmedications, diet and exercise. Patient agreed with treatment plan. Follow up as directed. MEDICATIONS:  No orders of the defined types were placed in this encounter. ORDERS:  Orders Placed This Encounter   Procedures    JOVAN DIGITAL SCREEN W OR WO CAD BILATERAL    Comprehensive Metabolic Panel    Lipid Panel       Follow-up:  Return in about 1 year (around 4/27/2023) for she is moving. PATIENT INSTRUCTIONS:  There are no Patient Instructions on file for this visit. Electronically signed by LAWANDA Mosher CNP on 4/27/2022 at 10:43 AM    EMR Dragon/transcription disclaimer:  Much of thisencounter note is electronic transcription/translation of spoken language to printed texts. The electronic translation of spoken language may be erroneous, or at times, nonsensical words or phrases may be inadvertentlytranscribed.   Although I have reviewed the note for such errors, some may still exist.

## 2022-05-06 ENCOUNTER — HOSPITAL ENCOUNTER (OUTPATIENT)
Dept: WOMENS IMAGING | Age: 57
Discharge: HOME | End: 2022-05-06
Payer: COMMERCIAL

## 2022-05-06 DIAGNOSIS — Z12.31 ENCOUNTER FOR SCREENING MAMMOGRAM FOR BREAST CANCER: ICD-10-CM

## 2022-05-06 PROCEDURE — 77063 BREAST TOMOSYNTHESIS BI: CPT

## 2022-05-19 ENCOUNTER — TELEPHONE (OUTPATIENT)
Dept: PRIMARY CARE CLINIC | Age: 57
End: 2022-05-19

## 2022-05-19 RX ORDER — VALACYCLOVIR HYDROCHLORIDE 1 G/1
TABLET, FILM COATED ORAL
Qty: 6 TABLET | Refills: 3 | Status: SHIPPED | OUTPATIENT
Start: 2022-05-19

## 2022-05-19 RX ORDER — VALACYCLOVIR HYDROCHLORIDE 1 G/1
TABLET, FILM COATED ORAL
Qty: 6 TABLET | Refills: 0 | Status: SHIPPED | OUTPATIENT
Start: 2022-05-19 | End: 2022-05-19 | Stop reason: SDUPTHER

## 2022-05-19 RX ORDER — ESTRADIOL 10 UG/1
INSERT VAGINAL
Qty: 8 TABLET | Refills: 3 | Status: SHIPPED | OUTPATIENT
Start: 2022-05-19

## 2023-01-23 RX ORDER — ESTRADIOL 10 UG/1
INSERT VAGINAL
Qty: 8 TABLET | Refills: 5 | Status: SHIPPED | OUTPATIENT
Start: 2023-01-23

## 2023-07-07 RX ORDER — ESTRADIOL 10 UG/1
INSERT VAGINAL
Qty: 8 TABLET | Refills: 0 | Status: SHIPPED | OUTPATIENT
Start: 2023-07-07